# Patient Record
Sex: FEMALE | Race: WHITE
[De-identification: names, ages, dates, MRNs, and addresses within clinical notes are randomized per-mention and may not be internally consistent; named-entity substitution may affect disease eponyms.]

---

## 2018-08-24 ENCOUNTER — HOSPITAL ENCOUNTER (OUTPATIENT)
Dept: HOSPITAL 7 - FB.SDS | Age: 53
Discharge: HOME | End: 2018-08-24
Attending: SURGERY
Payer: COMMERCIAL

## 2018-08-24 DIAGNOSIS — Z80.0: ICD-10-CM

## 2018-08-24 DIAGNOSIS — Z79.899: ICD-10-CM

## 2018-08-24 DIAGNOSIS — K63.5: ICD-10-CM

## 2018-08-24 DIAGNOSIS — K92.1: Primary | ICD-10-CM

## 2018-08-24 DIAGNOSIS — K64.8: ICD-10-CM

## 2018-08-24 DIAGNOSIS — F17.210: ICD-10-CM

## 2018-08-24 DIAGNOSIS — K62.1: ICD-10-CM

## 2018-08-24 PROCEDURE — 45380 COLONOSCOPY AND BIOPSY: CPT

## 2018-08-24 PROCEDURE — 88305 TISSUE EXAM BY PATHOLOGIST: CPT

## 2018-08-24 NOTE — PCM.OPNOTE
- General Post-Op/Procedure Note


Date of Surgery/Procedure: 08/24/18


Operative Procedure(s): c scope


Findings: 





hypeplastic polyps 


descending colon and rectum 


internal hemorrhoids 


Pre Op Diagnosis: hematochezia


Post-Op Diagnosis: hypeplastic polyps.  descending colon and rectum.  internal 

hemorrhoids


Anesthesia Technique: MAC


Primary Surgeon: Pipe Vo


Anesthesia Provider: Tomeka Diaz


Pathology: 








hypeplastic polyps 


descending colon and rectum 


internal hemorrhoids 


Complications: None


Condition: Good


Free Text/Narrative:: 





see dictation

## 2018-08-24 NOTE — OR
DATE OF OPERATION:  08/24/2018

 

SURGEON:  Pipe Vo MD

 

PROCEDURE PERFORMED:  Colonoscopy with cold forceps biopsy.

 

PREOPERATIVE DIAGNOSIS:  History of bleeding per rectum.

 

POSTOPERATIVE DIAGNOSES:  Hyperplastic polyps, descending colon and rectum; and

internal hemorrhoids.

 

INDICATIONS FOR PROCEDURE:  This is a 53-year-old white female who has a single

history of some blood per rectum.  She never had a colonoscopy.  We recommended

a colonoscopy as part of a diagnostic workup to explain the bleeding.

 

DESCRIPTION OF PROCEDURE:  After an excellent IV sedation was administered,

digital rectal exam was performed.  No marked abnormality was noted.  Flexible

colonoscope was inserted and advanced to the cecum without difficulty.  The prep

was excellent.  The following findings were noted:

 

Ascending colon, unremarkable.  Transverse colon, unremarkable.  Descending

colon, a small hyperplastic-appearing polyp, biopsied and sent for permanent.

Sigmoid, unremarkable.  Rectum, 3 small hyperplastic-appearing polyps, biopsied

and sent for permanent.  On retroflexing the scope, there was evidence of

internal hemorrhoids.  Colon was deflated.  Scope was removed.  Patient

tolerated procedure well, was taken to recovery in good condition.  Results by

letter.

 

Job#: [id number]

DD: 08/24/2018 0818

DT: 08/24/2018 1046 AS/MODL